# Patient Record
Sex: MALE | Race: OTHER
[De-identification: names, ages, dates, MRNs, and addresses within clinical notes are randomized per-mention and may not be internally consistent; named-entity substitution may affect disease eponyms.]

---

## 2018-04-06 ENCOUNTER — HOSPITAL ENCOUNTER (OUTPATIENT)
Dept: HOSPITAL 72 - SUR | Age: 38
Discharge: HOME | End: 2018-04-06
Payer: COMMERCIAL

## 2018-04-06 VITALS — SYSTOLIC BLOOD PRESSURE: 143 MMHG | DIASTOLIC BLOOD PRESSURE: 86 MMHG

## 2018-04-06 VITALS — DIASTOLIC BLOOD PRESSURE: 82 MMHG | SYSTOLIC BLOOD PRESSURE: 133 MMHG

## 2018-04-06 VITALS — DIASTOLIC BLOOD PRESSURE: 91 MMHG | SYSTOLIC BLOOD PRESSURE: 143 MMHG

## 2018-04-06 VITALS — DIASTOLIC BLOOD PRESSURE: 87 MMHG | SYSTOLIC BLOOD PRESSURE: 128 MMHG

## 2018-04-06 VITALS — SYSTOLIC BLOOD PRESSURE: 128 MMHG | DIASTOLIC BLOOD PRESSURE: 83 MMHG

## 2018-04-06 VITALS — DIASTOLIC BLOOD PRESSURE: 95 MMHG | SYSTOLIC BLOOD PRESSURE: 141 MMHG

## 2018-04-06 VITALS — DIASTOLIC BLOOD PRESSURE: 76 MMHG | SYSTOLIC BLOOD PRESSURE: 128 MMHG

## 2018-04-06 VITALS — HEIGHT: 70 IN | WEIGHT: 155.06 LBS | BODY MASS INDEX: 22.2 KG/M2

## 2018-04-06 VITALS — SYSTOLIC BLOOD PRESSURE: 134 MMHG | DIASTOLIC BLOOD PRESSURE: 60 MMHG

## 2018-04-06 VITALS — DIASTOLIC BLOOD PRESSURE: 79 MMHG | SYSTOLIC BLOOD PRESSURE: 123 MMHG

## 2018-04-06 VITALS — DIASTOLIC BLOOD PRESSURE: 98 MMHG | SYSTOLIC BLOOD PRESSURE: 139 MMHG

## 2018-04-06 VITALS — SYSTOLIC BLOOD PRESSURE: 133 MMHG | DIASTOLIC BLOOD PRESSURE: 87 MMHG

## 2018-04-06 DIAGNOSIS — J34.2: Primary | ICD-10-CM

## 2018-04-06 DIAGNOSIS — J34.3: ICD-10-CM

## 2018-04-06 PROCEDURE — 30140 RESECT INFERIOR TURBINATE: CPT

## 2018-04-06 PROCEDURE — 94150 VITAL CAPACITY TEST: CPT

## 2018-04-06 PROCEDURE — 94003 VENT MGMT INPAT SUBQ DAY: CPT

## 2018-04-06 PROCEDURE — 30520 REPAIR OF NASAL SEPTUM: CPT

## 2018-04-06 NOTE — PRE-PROCEDURE NOTE/ATTESTATION
Pre-Procedure Note/Attestation


Complete Prior to Procedure


Planned Procedure:  not applicable


Procedure Narrative:


nasal obstruction unresponsive to medication





Indications for Procedure


Pre-Operative Diagnosis:


septal deviation, bilateral hypertrophied inferior turbinates





Attestation


I attest that I discussed the nature of the procedure; its benefits; risks and 

complications; and alternatives (and the risks and benefits of such alternatives

), prior to the procedure, with the patient (or the patient's legal 

representative).





I attest that, if there was a reasonable possibility of needing a blood 

transfusion, the patient (or the patient's legal representative) was given the 

[facility] standardized written summary, pursuant 

to the Keith Rippey Blood Safety Act ([place] Health and Safety Code # 1645, as 

amended).





I attest that I re-evaluated the patient just prior to the surgery and that 

there has been no change in the patient's H&P, except as documented below:











FERN WEI Apr 6, 2018 11:40

## 2018-04-06 NOTE — IMMEDIATE POST-OP EVALUATION
Immediate Post-Op Evalulation


Immediate Post-Op Evalulation


Procedure:  Septoplasty, SMR Turbinates


Date of Evaluation:  2018


Time of Evaluation:  14:12


IV Fluids:  1000 LR


Blood Products:  0


Estimated Blood Loss:  50


Urinary Output:  0


Blood Pressure Systolic:  133


Blood Pressure Diastolic:  87


Pulse Rate:  67


Respiratory Rate:  16


O2 Sat by Pulse Oximetry:  100


Temperature (Fahrenheit):  97.9


Pain Score (1-10):  2


Nausea:  No


Vomiting:  No


Complications


0


Patient Status:  awake, reacts, patent, extubated, none


Hydration Status:  adequate


Dru Gram Ancef IV


Given Within 1 Hr of Incision:  Yes


Time Given:  12:04











Wero Fleming MD 2018 11:00

## 2018-04-06 NOTE — ANETHESIA PREOPERATIVE EVAL
Anesthesia Pre-op PMH/ROS


General


Date of Evaluation:  2018


Time of Evaluation:  11:51


Anesthesiologist:  Bernadette


ASA Score:  ASA 1


Mallampati Score


Class I : Soft palate, uvula, fauces, pillars visible


Class II: Soft palate, uvula, fauces visible


Class III: Soft palate, base of uvula visible


Class IV: Only hard plate visible


Mallampati Classification:  Class I


Surgeon:  Brian


Diagnosis:  Deviated Septum


Surgical Procedure:  Septoplasty, SMR Turbinates


Anesthesia History:  none


Family History:  no anesthesia problems


Allergies:  


Coded Allergies:  


     No Known Allergies (Unverified , 18)


Medications:  see eMAR





Past Medical History


Neurologic/Psychiatric:  Reports: other - Migranes


PSxH Narrative:


L Knee Arthroscopy





Anesthesia Pre-op Phys. Exam


Physician Exam





Last Vital Signs








  Date Time  Temp Pulse Resp B/P (MAP) Pulse Ox O2 Delivery O2 Flow Rate FiO2


 


18 09:10 97.9 55 18 134/60 97 Room Air  





 97.9       








Constitutional:  NAD


Neurologic:  CN 2-12 intact


Cardiovascular:  RRR


Respiratory:  CTA


Gastrointestinal:  S/NT/ND





Airway Exam


Mallampati Classification


ASA 1


Mallampati Score:  Class I


MO:  full


ROM:  full


Teeth:  intact





Anesthesia Pre-op A/P


Risk Assessment & Plan


Assessment:


ASA 1


Plan:


GA, BIS, GlideScope Go


Status Change Before Surgery:  No





Pre-Antibiotics


Dru Gram Ancef IV


Given Within 1 Hr of Incision:  Yes


Time Given:  12:04











Wero Fleming MD 2018 10:59

## 2018-04-06 NOTE — BRIEF OPERATIVE NOTE
Immediate Post Operative Note


Operative Note


Pre-op Diagnosis:


septal deviation, bilateral hypertrophied inferior turbinates


Procedure:


septoplasty, bilateral inferior turbinectomies with intramural coagulation


Post-op Diagnosis:  same as pre-op


Surgeon:  Lennox Wei M.D.


Anesthesiologist:  Warren


Anesthesia:  general


Specimen:  yes - septum


Complications:  none


Condition:  stable


Fluids:  ringers lactate


Estimated Blood Loss:  minimal


Drains:  none


Packing:  telfa


Implant(s) used?:  No











LENNOX WEI Apr 6, 2018 13:53

## 2018-04-06 NOTE — 48 HOUR POST ANESTHESIA EVAL
Post Anesthesia Evaluation


Procedure:  Septoplasty, SMR Turbinates


Date of Evaluation:  Apr 6, 2018


Time of Evaluation:  16:22


Blood Pressure Systolic:  128


0:  63


Pulse Rate:  56


Respiratory Rate:  18


Temperature (Fahrenheit):  98.2


O2 Sat by Pulse Oximetry:  100


Airway:  patent


Nausea:  No


Vomiting:  No


Pain Intensity:  0


Hydration Status:  adequate


Cardiopulmonary Status:


Stable


Mental Status/LOC:  patient returned to baseline


Follow-up Care/Observations:


0


Post-Anesthesia Complications:


0


Follow-up care needed:  ready to discharge











Wero Fleming MD Apr 6, 2018 11:01

## 2018-04-08 NOTE — OPERATIVE NOTE - DICTATED
DATE OF OPERATION:  04/06/2018



SURGEON:  Lennox Torres M.D.



ANESTHESIOLOGIST:  Wero Fleming M.D.



PREOPERATIVE DIAGNOSES:

1. Septal deviation.

2. Bilateral hypertrophied inferior turbinates.



POSTOPERATIVE DIAGNOSES:

1. Septal deviation.

2. Bilateral hypertrophied inferior turbinates.



PROCEDURES:

1. Septoplasty.

2. Bilateral inferior turbinectomies with intramural coagulation.



INDICATIONS FOR SURGERY:  The patient is a 37-year-old  male who

complains of bilateral nasal obstruction, unresponsive to medication.

Examination revealed a narrow left nostril secondary to his left septal

deviation pushing the columella leftward and then the septum curved

superiorly to the right blocking the superior aspect of the right nasal

chamber.  This deformity is combined with the patient's tremendous

bilateral hypertrophied inferior turbinates create a bilateral nasal

airway obstruction.



The findings at surgery revealed an S-shaped septal deviation curving to

the left inferiorly and pushing the columella to the left blocking the

left nostril region and then the septum to the right with huge bone

deviation blocking the more superior aspect of the right nasal chamber.

This combined with the patient's bilateral hypertrophied inferior

turbinates created bilateral nasal airway obstruction.



DESCRIPTION OF PROCEDURE:  The patient was brought to the operating room

while premedicated and having received preoperative antibiotics.  He was

then placed in supine position on the operating room table.  After the

patient underwent satisfactory intubation, he was given IV sedation.  The

nasal cavity was then sprayed with 0.25% Pasquale-Synephrine.  Sterile Q-tips

saturated with Betadine were used to sterilize the intranasal cavity.

Less than 24 mL of 1% Xylocaine with 1:100,000 epinephrine per mL were

used to inject the nasal and septal frameworks.  Less than 200 mg of

cocaine were used on intranasal packing.  The patient was then prepped and

draped in the usual sterile fashion.  It is of note because of the

patient's bilateral nasal obstruction, only a small amount of packing

could be placed in both nasal cavity regions.



The nasal packing was removed from the nasal cavity.  A #15 blade was

used to make a left inferior septal incision and left mucoperichondrium

mucoperiosteal flap was elevated.  An incision was made between the

cartilage and bony septum and a right mucoperiosteal flap was elevated.

That portion of overriding obstructing perpendicular plate of the ethmoid

and vomer bone were incised in strips,  from _____ attachments

and removed from the field of operation.  A similar procedure was

performed on the portion of the mid cartilage area, which was also

deviated to the right.  Re-examination still revealed a large

hypertrophied bone walking at portion of the superior aspect the right

nasal chamber.  This was removed by fracturing the bone both through

anterior and posterior to the deviated area and removed with a small piece

of the bone from the pocket.  This was continued until there were no

attachments left.  The large thicker bone was grasped with Maral and

removed from the field of operation.



Examination still revealed a narrow left nostril area and that the

anterior nasal spine was also deviated to the left.  The anterior nasal

spine was fractured in midline using mallet and chisel technique.

Re-examination still revealed the columella deviated secondary to the

cartilage being deflected at 45-degree angle.  That portion of deviation

was cross-hatched with #15 blade and mobilized in more midline position

for breathing.  Examination now revealed septum in a more midline

physiologic position for breathing.



Bilateral intramural coagulation of both inferior turbinates was then

performed.  An incision was made on the undersurface of both inferior

turbinates and mucosa stripped the underlying bone.  The inferior

turbinates were then outfractured and small piece of bone removed from the

pocket.  Examination now revealed the patient to have a good bilateral

nasal airway.  All blood was suctioned from the nose and nasopharynx area.

A 4-0 plain was used to close the septal incision as well as to splint

the septum.  Telfa coated with Betadine ointment was then secured in place

and the procedure was terminated.  The patient tolerated the procedure

well and left the operating room in satisfactory condition.  Estimated

blood loss was less than 30 mL.  Sponge and needle count were correct.









  ______________________________________________

  Lennox Torres M.D.





DR:  VARINDER

D:  04/07/2018 18:57

T:  04/08/2018 01:39

JOB#:  [id number]

CC: